# Patient Record
Sex: FEMALE | Race: ASIAN | Employment: UNEMPLOYED | URBAN - METROPOLITAN AREA
[De-identification: names, ages, dates, MRNs, and addresses within clinical notes are randomized per-mention and may not be internally consistent; named-entity substitution may affect disease eponyms.]

---

## 2019-09-16 ENCOUNTER — HOSPITAL ENCOUNTER (OUTPATIENT)
Dept: GENERAL RADIOLOGY | Age: 39
Discharge: HOME | End: 2019-09-16
Payer: COMMERCIAL

## 2019-09-16 DIAGNOSIS — J01.00 ACUTE NON-RECURRENT MAXILLARY SINUSITIS: ICD-10-CM

## 2019-09-16 PROCEDURE — 70220 X-RAY EXAM OF SINUSES: CPT

## 2023-03-14 ENCOUNTER — HOSPITAL ENCOUNTER (EMERGENCY)
Age: 43
Discharge: HOME | End: 2023-03-14
Attending: EMERGENCY MEDICINE
Payer: COMMERCIAL

## 2023-03-14 ENCOUNTER — APPOINTMENT (OUTPATIENT)
Dept: CT IMAGING | Age: 43
End: 2023-03-14
Attending: EMERGENCY MEDICINE
Payer: COMMERCIAL

## 2023-03-14 VITALS
DIASTOLIC BLOOD PRESSURE: 69 MMHG | SYSTOLIC BLOOD PRESSURE: 123 MMHG | OXYGEN SATURATION: 100 % | RESPIRATION RATE: 20 BRPM | HEART RATE: 52 BPM | TEMPERATURE: 97.4 F | WEIGHT: 164.9 LBS

## 2023-03-14 DIAGNOSIS — N20.1 LEFT URETERAL STONE: Primary | ICD-10-CM

## 2023-03-14 DIAGNOSIS — M54.41 ACUTE RIGHT-SIDED LOW BACK PAIN WITH RIGHT-SIDED SCIATICA: ICD-10-CM

## 2023-03-14 LAB
ALBUMIN SERPL-MCNC: 4.7 G/DL (ref 3.5–5)
ALBUMIN/GLOB SERPL: 1.3 (ref 1.1–2.2)
ALP SERPL-CCNC: 81 U/L (ref 45–117)
ALT SERPL-CCNC: 21 U/L (ref 12–78)
ANION GAP SERPL CALC-SCNC: 11 MMOL/L (ref 5–15)
APPEARANCE UR: CLEAR
AST SERPL-CCNC: 17 U/L (ref 15–37)
BACTERIA URNS QL MICRO: ABNORMAL /HPF
BASOPHILS # BLD: 0.1 K/UL (ref 0–0.1)
BASOPHILS NFR BLD: 1 % (ref 0–1)
BILIRUB SERPL-MCNC: 0.4 MG/DL (ref 0.2–1)
BILIRUB UR QL: NEGATIVE
BUN SERPL-MCNC: 11 MG/DL (ref 6–20)
BUN/CREAT SERPL: 12 (ref 12–20)
CALCIUM SERPL-MCNC: 9.6 MG/DL (ref 8.5–10.1)
CHLORIDE SERPL-SCNC: 101 MMOL/L (ref 97–108)
CO2 SERPL-SCNC: 27 MMOL/L (ref 21–32)
COLOR UR: ABNORMAL
CREAT SERPL-MCNC: 0.89 MG/DL (ref 0.55–1.02)
DIFFERENTIAL METHOD BLD: ABNORMAL
EOSINOPHIL # BLD: 0.1 K/UL (ref 0–0.4)
EOSINOPHIL NFR BLD: 1 % (ref 0–7)
EPITH CASTS URNS QL MICRO: ABNORMAL /LPF
ERYTHROCYTE [DISTWIDTH] IN BLOOD BY AUTOMATED COUNT: 13 % (ref 11.5–14.5)
GLOBULIN SER CALC-MCNC: 3.7 G/DL (ref 2–4)
GLUCOSE SERPL-MCNC: 111 MG/DL (ref 65–100)
GLUCOSE UR STRIP.AUTO-MCNC: NEGATIVE MG/DL
HCG UR QL: NEGATIVE
HCT VFR BLD AUTO: 40.9 % (ref 35–47)
HGB BLD-MCNC: 13.7 G/DL (ref 11.5–16)
HGB UR QL STRIP: ABNORMAL
IMM GRANULOCYTES # BLD AUTO: 0.1 K/UL (ref 0–0.04)
IMM GRANULOCYTES NFR BLD AUTO: 0 % (ref 0–0.5)
KETONES UR QL STRIP.AUTO: ABNORMAL MG/DL
LEUKOCYTE ESTERASE UR QL STRIP.AUTO: NEGATIVE
LYMPHOCYTES # BLD: 2 K/UL (ref 0.8–3.5)
LYMPHOCYTES NFR BLD: 13 % (ref 12–49)
MCH RBC QN AUTO: 26.9 PG (ref 26–34)
MCHC RBC AUTO-ENTMCNC: 33.5 G/DL (ref 30–36.5)
MCV RBC AUTO: 80.4 FL (ref 80–99)
MONOCYTES # BLD: 0.6 K/UL (ref 0–1)
MONOCYTES NFR BLD: 4 % (ref 5–13)
NEUTS SEG # BLD: 13.1 K/UL (ref 1.8–8)
NEUTS SEG NFR BLD: 81 % (ref 32–75)
NITRITE UR QL STRIP.AUTO: NEGATIVE
NRBC # BLD: 0 K/UL (ref 0–0.01)
NRBC BLD-RTO: 0 PER 100 WBC
PH UR STRIP: 6.5 (ref 5–8)
PLATELET # BLD AUTO: 354 K/UL (ref 150–400)
PMV BLD AUTO: 9.7 FL (ref 8.9–12.9)
POTASSIUM SERPL-SCNC: 3.9 MMOL/L (ref 3.5–5.1)
PROT SERPL-MCNC: 8.4 G/DL (ref 6.4–8.2)
PROT UR STRIP-MCNC: NEGATIVE MG/DL
RBC # BLD AUTO: 5.09 M/UL (ref 3.8–5.2)
RBC #/AREA URNS HPF: ABNORMAL /HPF (ref 0–5)
SODIUM SERPL-SCNC: 139 MMOL/L (ref 136–145)
SP GR UR REFRACTOMETRY: 1.01 (ref 1–1.03)
UA: UC IF INDICATED,UAUC: ABNORMAL
UROBILINOGEN UR QL STRIP.AUTO: 0.2 EU/DL (ref 0.2–1)
WBC # BLD AUTO: 15.9 K/UL (ref 3.6–11)
WBC URNS QL MICRO: ABNORMAL /HPF (ref 0–4)

## 2023-03-14 PROCEDURE — 74011250636 HC RX REV CODE- 250/636: Performed by: EMERGENCY MEDICINE

## 2023-03-14 PROCEDURE — 96360 HYDRATION IV INFUSION INIT: CPT

## 2023-03-14 PROCEDURE — 81025 URINE PREGNANCY TEST: CPT

## 2023-03-14 PROCEDURE — 36415 COLL VENOUS BLD VENIPUNCTURE: CPT

## 2023-03-14 PROCEDURE — 74176 CT ABD & PELVIS W/O CONTRAST: CPT

## 2023-03-14 PROCEDURE — 81001 URINALYSIS AUTO W/SCOPE: CPT

## 2023-03-14 PROCEDURE — 85025 COMPLETE CBC W/AUTO DIFF WBC: CPT

## 2023-03-14 PROCEDURE — 99284 EMERGENCY DEPT VISIT MOD MDM: CPT

## 2023-03-14 PROCEDURE — 80053 COMPREHEN METABOLIC PANEL: CPT

## 2023-03-14 PROCEDURE — 96361 HYDRATE IV INFUSION ADD-ON: CPT

## 2023-03-14 RX ORDER — ONDANSETRON 2 MG/ML
4 INJECTION INTRAMUSCULAR; INTRAVENOUS ONCE
Status: DISCONTINUED | OUTPATIENT
Start: 2023-03-14 | End: 2023-03-14

## 2023-03-14 RX ORDER — ONDANSETRON 4 MG/1
4 TABLET, ORALLY DISINTEGRATING ORAL
Qty: 10 TABLET | Refills: 0 | Status: SHIPPED | OUTPATIENT
Start: 2023-03-14

## 2023-03-14 RX ORDER — NAPROXEN 500 MG/1
500 TABLET ORAL 2 TIMES DAILY WITH MEALS
Qty: 20 TABLET | Refills: 0 | Status: SHIPPED | OUTPATIENT
Start: 2023-03-14 | End: 2023-03-24

## 2023-03-14 RX ADMIN — SODIUM CHLORIDE 1000 ML: 9 INJECTION, SOLUTION INTRAVENOUS at 19:42

## 2023-03-14 NOTE — DISCHARGE INSTRUCTIONS
Stay well-hydrated, take Naprosyn as needed for pain and Zofran as needed for any nausea or vomiting. Follow-up with your primary care doctor return for new or worsening symptoms.

## 2023-03-14 NOTE — Clinical Note
[facility]  [address]  [id number]  [phone]    Work/School Note    Date: 3/14/2023    To Whom It May concern:    Berto Phelan was seen and treated today in the emergency room by the following provider(s):  Attending Provider: Eileen Prabhakar MD.      Berto Phelan is excused from work/school on 03/14/23 and 03/15/23. She is medically clear to return to work/school on 3/16/2023.        Sincerely,          Nicho Thao MD

## 2023-03-14 NOTE — ED NOTES
Patient is in the bed, resting. Reports the nausea and pain are no longer present. Will continue to monitor.

## 2023-03-14 NOTE — ED TRIAGE NOTES
Triage Note: Patient arrives to ER complaining of left sided flank pain, dysuria, nausea, and vomiting starting today. Patient denies hematuria and fevers. Patient is ambulatory to room with steady gait.

## 2023-03-17 NOTE — ED PROVIDER NOTES
History of Present Illness:  Patient arrives to ER complaining of left sided flank pain, dysuria, nausea, and vomiting starting today. Patient denies hematuria and fevers    History reviewed. No pertinent past medical history. Past Surgical History:   Procedure Laterality Date    HX GYN      removal left ovay cyst         Family History:   Problem Relation Age of Onset    Diabetes Mother     Hypertension Mother     Diabetes Maternal Grandmother        Social History     Socioeconomic History    Marital status:      Spouse name: Not on file    Number of children: Not on file    Years of education: Not on file    Highest education level: Not on file   Occupational History    Not on file   Tobacco Use    Smoking status: Never    Smokeless tobacco: Not on file   Substance and Sexual Activity    Alcohol use: No    Drug use: Not on file    Sexual activity: Not on file   Other Topics Concern    Not on file   Social History Narrative    Not on file     Social Determinants of Health     Financial Resource Strain: Not on file   Food Insecurity: Not on file   Transportation Needs: Not on file   Physical Activity: Not on file   Stress: Not on file   Social Connections: Not on file   Intimate Partner Violence: Not on file   Housing Stability: Not on file         ALLERGIES: Patient has no known allergies. Review of Systems   Gastrointestinal:  Positive for nausea and vomiting. Genitourinary:  Positive for dysuria and flank pain. All other systems reviewed and are negative. Vitals:    03/14/23 1849 03/14/23 1853 03/14/23 1950 03/14/23 2106   BP:   124/74 123/69   Pulse:       Resp:       Temp:       SpO2: 99% 99% 100% 100%   Weight:                Physical Exam  Vitals and nursing note reviewed. Constitutional:       General: She is not in acute distress. Appearance: She is normal weight. She is not ill-appearing, toxic-appearing or diaphoretic. HENT:      Head: Normocephalic and atraumatic.       Right Ear: External ear normal.      Left Ear: External ear normal.      Nose: Nose normal. No congestion or rhinorrhea. Mouth/Throat:      Mouth: Mucous membranes are moist.      Pharynx: Oropharynx is clear. No oropharyngeal exudate or posterior oropharyngeal erythema. Eyes:      General: No scleral icterus. Extraocular Movements: Extraocular movements intact. Conjunctiva/sclera: Conjunctivae normal.   Cardiovascular:      Rate and Rhythm: Normal rate and regular rhythm. Pulses: Normal pulses. Heart sounds: Normal heart sounds. No murmur heard. No gallop. Pulmonary:      Effort: Pulmonary effort is normal. No respiratory distress. Breath sounds: Normal breath sounds. Abdominal:      General: Abdomen is flat. Bowel sounds are normal. There is no distension. Palpations: Abdomen is soft. Tenderness: There is no abdominal tenderness. There is left CVA tenderness. There is no guarding or rebound. Musculoskeletal:         General: No swelling, tenderness or deformity. Normal range of motion. Cervical back: Normal range of motion and neck supple. No rigidity or tenderness. Right lower leg: No edema. Left lower leg: No edema. Lymphadenopathy:      Cervical: No cervical adenopathy. Skin:     General: Skin is warm. Capillary Refill: Capillary refill takes less than 2 seconds. Coloration: Skin is not jaundiced. Findings: No bruising or erythema. Neurological:      General: No focal deficit present. Mental Status: She is alert and oriented to person, place, and time. Cranial Nerves: No cranial nerve deficit. Motor: No weakness. Psychiatric:         Mood and Affect: Mood normal.         Thought Content:  Thought content normal.        Recent Results (from the past 72 hour(s))   CBC WITH AUTOMATED DIFF    Collection Time: 03/14/23  5:44 PM   Result Value Ref Range    WBC 15.9 (H) 3.6 - 11.0 K/uL    RBC 5.09 3.80 - 5.20 M/uL    HGB 13.7 11.5 - 16.0 g/dL    HCT 40.9 35.0 - 47.0 %    MCV 80.4 80.0 - 99.0 FL    MCH 26.9 26.0 - 34.0 PG    MCHC 33.5 30.0 - 36.5 g/dL    RDW 13.0 11.5 - 14.5 %    PLATELET 033 936 - 589 K/uL    MPV 9.7 8.9 - 12.9 FL    NRBC 0.0 0  WBC    ABSOLUTE NRBC 0.00 0.00 - 0.01 K/uL    NEUTROPHILS 81 (H) 32 - 75 %    LYMPHOCYTES 13 12 - 49 %    MONOCYTES 4 (L) 5 - 13 %    EOSINOPHILS 1 0 - 7 %    BASOPHILS 1 0 - 1 %    IMMATURE GRANULOCYTES 0 0.0 - 0.5 %    ABS. NEUTROPHILS 13.1 (H) 1.8 - 8.0 K/UL    ABS. LYMPHOCYTES 2.0 0.8 - 3.5 K/UL    ABS. MONOCYTES 0.6 0.0 - 1.0 K/UL    ABS. EOSINOPHILS 0.1 0.0 - 0.4 K/UL    ABS. BASOPHILS 0.1 0.0 - 0.1 K/UL    ABS. IMM. GRANS. 0.1 (H) 0.00 - 0.04 K/UL    DF AUTOMATED     METABOLIC PANEL, COMPREHENSIVE    Collection Time: 03/14/23  5:44 PM   Result Value Ref Range    Sodium 139 136 - 145 mmol/L    Potassium 3.9 3.5 - 5.1 mmol/L    Chloride 101 97 - 108 mmol/L    CO2 27 21 - 32 mmol/L    Anion gap 11 5 - 15 mmol/L    Glucose 111 (H) 65 - 100 mg/dL    BUN 11 6 - 20 MG/DL    Creatinine 0.89 0.55 - 1.02 MG/DL    BUN/Creatinine ratio 12 12 - 20      eGFR >60 >60 ml/min/1.73m2    Calcium 9.6 8.5 - 10.1 MG/DL    Bilirubin, total 0.4 0.2 - 1.0 MG/DL    ALT (SGPT) 21 12 - 78 U/L    AST (SGOT) 17 15 - 37 U/L    Alk.  phosphatase 81 45 - 117 U/L    Protein, total 8.4 (H) 6.4 - 8.2 g/dL    Albumin 4.7 3.5 - 5.0 g/dL    Globulin 3.7 2.0 - 4.0 g/dL    A-G Ratio 1.3 1.1 - 2.2     URINALYSIS W/ REFLEX CULTURE    Collection Time: 03/14/23  6:59 PM    Specimen: Urine   Result Value Ref Range    Color YELLOW/STRAW      Appearance CLEAR CLEAR      Specific gravity 1.015 1.003 - 1.030      pH (UA) 6.5 5.0 - 8.0      Protein Negative NEG mg/dL    Glucose Negative NEG mg/dL    Ketone TRACE (A) NEG mg/dL    Bilirubin Negative NEG      Blood TRACE (A) NEG      Urobilinogen 0.2 0.2 - 1.0 EU/dL    Nitrites Negative NEG      Leukocyte Esterase Negative NEG      WBC 5-10 0 - 4 /hpf    RBC 5-10 0 - 5 /hpf Epithelial cells MANY (A) FEW /lpf    Bacteria 1+ (A) NEG /hpf    UA:UC IF INDICATED CULTURE NOT INDICATED BY UA RESULT     HCG URINE, QL. - POC    Collection Time: 03/14/23  7:44 PM   Result Value Ref Range    Pregnancy test,urine (POC) Negative NEG         CT ABD PELV WO CONT   Final Result   3 mm stone lodged at the left UVJ, with mild left hydroureter and   pelvocaliectasis. Medical Decision Making  I considered kidney stone, pyelonephritis, UTI, lumbago, lumbar strain, radiculopathy, herpes zoster, spinal neuroforaminal cysts, constipation, diverticulitis, colitis, appendicitis and other causes. Problems Addressed:  Acute right-sided low back pain with right-sided sciatica: complicated acute illness or injury  Left ureteral stone: complicated acute illness or injury with systemic symptoms    Amount and/or Complexity of Data Reviewed  Independent Historian: spouse  External Data Reviewed: notes. Details: Prior visits for wellness exam, screening for infectious disease, seasonal allergies, low back pain, sinusitis, otitis media and other  Labs: ordered. Details: White blood cell count elevated, H&H and platelets normal, chemistries normal, kidney function normal, liver functions normal, urinalysis demonstrates blood. Radiology: ordered. Details: 3 mm stone which appears to be in the bladder adjacent to the left UVJ    Risk  OTC drugs. Prescription drug management. Risk Details: The patient ultiumately did not warrant hospitalization nor any acute surgical intervention, I considered the need for both. Also considered the need to obtain additional imaging and/or labs beyond what may have been ordered but no additional testing was indicated based on the patient's condition and results of any other testing that may have been performed. Any medications given here and/or prescribed for discharge are documented within the other portions of the note.   After any medications or treatments that may have been provided here the patient was improved and symptoms had resolved or become tolerable or no medications or treatments were indicated based on the patient's condition. The patient was deemed stable safe and appropriate for discharge to home and is instructed to follow-up with his primary care doctor and return for any new or worsening symptoms. Procedures    Medications   sodium chloride 0.9 % bolus infusion 1,000 mL (0 mL IntraVENous IV Completed 3/14/23 5949)          My Medications        START taking these medications        Instructions Each Dose to Equal Morning Noon Evening Bedtime   lidocaine HCL 4 % Ptmd    Your last dose was: Your next dose is:         1 Patch by Apply Externally route once over twenty-four (24) hours for 10 days. 1 Patch                 naproxen 500 mg tablet  Commonly known as: Naprosyn    Your last dose was: Your next dose is: Take 1 Tablet by mouth two (2) times daily (with meals) for 10 days. 500 mg                 ondansetron 4 mg disintegrating tablet  Commonly known as: ZOFRAN ODT    Your last dose was: Your next dose is: Take 1 Tablet by mouth every eight (8) hours as needed for Nausea or Vomiting. 4 mg                        ASK your doctor about these medications        Instructions Each Dose to Equal Morning Noon Evening Bedtime   AdviL 200 mg tablet  Generic drug: ibuprofen    Your last dose was: Your next dose is: Take  by mouth. Where to Get Your Medications        Information on where to get these meds will be given to you by the nurse or doctor. Ask your nurse or doctor about these medications  lidocaine HCL 4 % Ptmd  naproxen 500 mg tablet  ondansetron 4 mg disintegrating tablet         Encounter Diagnoses     ICD-10-CM ICD-9-CM   1. Left ureteral stone  N20.1 592.1   2.  Acute right-sided low back pain with right-sided sciatica  M54.41 724.2     724.3       Follow-up Information       Follow up With Specialties Details Why Contact Info    Hernán Riggs   As needed             DISPOSITION:  Discharged